# Patient Record
Sex: FEMALE | HISPANIC OR LATINO | URBAN - METROPOLITAN AREA
[De-identification: names, ages, dates, MRNs, and addresses within clinical notes are randomized per-mention and may not be internally consistent; named-entity substitution may affect disease eponyms.]

---

## 2023-08-17 PROBLEM — 197321007: Status: ACTIVE | Noted: 2023-08-17

## 2023-08-17 PROBLEM — 428283002: Status: ACTIVE | Noted: 2023-08-17

## 2023-08-21 ENCOUNTER — LAB OUTSIDE AN ENCOUNTER (OUTPATIENT)
Dept: URBAN - METROPOLITAN AREA CLINIC 60 | Facility: CLINIC | Age: 48
End: 2023-08-21

## 2023-08-21 ENCOUNTER — OFFICE VISIT (OUTPATIENT)
Dept: URBAN - METROPOLITAN AREA CLINIC 60 | Facility: CLINIC | Age: 48
End: 2023-08-21
Payer: COMMERCIAL

## 2023-08-21 ENCOUNTER — WEB ENCOUNTER (OUTPATIENT)
Dept: URBAN - METROPOLITAN AREA CLINIC 60 | Facility: CLINIC | Age: 48
End: 2023-08-21

## 2023-08-21 VITALS
HEIGHT: 63 IN | OXYGEN SATURATION: 96 % | BODY MASS INDEX: 36.14 KG/M2 | WEIGHT: 204 LBS | DIASTOLIC BLOOD PRESSURE: 80 MMHG | SYSTOLIC BLOOD PRESSURE: 130 MMHG | RESPIRATION RATE: 20 BRPM | TEMPERATURE: 98.1 F | HEART RATE: 88 BPM

## 2023-08-21 DIAGNOSIS — K64.8 OTHER HEMORRHOIDS: ICD-10-CM

## 2023-08-21 DIAGNOSIS — R10.11 RUQ ABDOMINAL PAIN: ICD-10-CM

## 2023-08-21 DIAGNOSIS — K62.5 RECTAL BLEEDING: ICD-10-CM

## 2023-08-21 DIAGNOSIS — K76.0 FATTY LIVER: ICD-10-CM

## 2023-08-21 DIAGNOSIS — Z86.010 PERSONAL HISTORY OF COLONIC POLYPS: ICD-10-CM

## 2023-08-21 PROCEDURE — 99204 OFFICE O/P NEW MOD 45 MIN: CPT | Performed by: PHYSICIAN ASSISTANT

## 2023-08-21 RX ORDER — FLUCONAZOLE 200 MG/1
TABLET ORAL
Qty: 25 TABLET | Status: ACTIVE | COMMUNITY

## 2023-08-21 RX ORDER — SUMATRIPTAN SUCCINATE 100 MG/1
TABLET ORAL
Qty: 9 TABLET | Status: ACTIVE | COMMUNITY

## 2023-08-21 RX ORDER — FAMOTIDINE 20 MG/1
1 TABLET AS NEEDED TABLET, FILM COATED ORAL ONCE A DAY
Qty: 30 TABLET | Refills: 2 | OUTPATIENT
Start: 2023-08-21

## 2023-08-21 RX ORDER — POLYETHYLENE GLYCOL 3350, SODIUM CHLORIDE, SODIUM BICARBONATE, POTASSIUM CHLORIDE 420; 11.2; 5.72; 1.48 G/4L; G/4L; G/4L; G/4L
AS DIRECTED POWDER, FOR SOLUTION ORAL ONCE
Qty: 4000 | Refills: 0 | OUTPATIENT
Start: 2023-08-21 | End: 2023-08-22

## 2023-08-21 RX ORDER — FERROUS GLUCONATE 324 MG
TAKE ONE TABLET BY MOUTH TWICE A DAY TABLET ORAL
Qty: 120 UNSPECIFIED | Refills: 1 | Status: ACTIVE | COMMUNITY

## 2023-08-21 RX ORDER — ONDANSETRON HYDROCHLORIDE 4 MG/1
1 TABLET TABLET, FILM COATED ORAL
Qty: 2 | Refills: 0 | OUTPATIENT
Start: 2023-08-21

## 2023-08-21 NOTE — HPI-TODAY'S VISIT:
Imani is a pleasant 48-year-old female who presents today for evaluation of a surveillance colonoscopy and hemorrhoids.  Seen by PCP for right upper quadrant pain, worse with prandial events.  History of fatty liver disease.  Noted rectal bleeding.  History of colonoscopy proximately 20 years ago with a polyp removed.  History of 3 polyps removed about 20 years ago. No record available for review. She mentions chronic history of RUQ pain. Sometimes worse after meals. No specific dietary triggers. Notes a bowel movement daily with alternating bowel habits. Intermittent reflux taking TUMs as needed. Admits to nausea with prandial events but denies vomiting, dysphagia, odynophagia, hematemesis, coffee ground emesis, abnormal weight loss or melena. Occasional BRBPR when wiping and mixed with stools. Denies family history of colon cancer or colon polyps. No other GI complaints at this time

## 2023-08-28 ENCOUNTER — LAB OUTSIDE AN ENCOUNTER (OUTPATIENT)
Dept: URBAN - METROPOLITAN AREA CLINIC 60 | Facility: CLINIC | Age: 48
End: 2023-08-28

## 2023-08-30 ENCOUNTER — TELEPHONE ENCOUNTER (OUTPATIENT)
Dept: URBAN - METROPOLITAN AREA CLINIC 63 | Facility: CLINIC | Age: 48
End: 2023-08-30

## 2023-08-30 ENCOUNTER — LAB OUTSIDE AN ENCOUNTER (OUTPATIENT)
Dept: URBAN - METROPOLITAN AREA CLINIC 63 | Facility: CLINIC | Age: 48
End: 2023-08-30

## 2023-09-12 ENCOUNTER — TELEPHONE ENCOUNTER (OUTPATIENT)
Dept: URBAN - METROPOLITAN AREA CLINIC 63 | Facility: CLINIC | Age: 48
End: 2023-09-12

## 2023-09-12 ENCOUNTER — LAB OUTSIDE AN ENCOUNTER (OUTPATIENT)
Dept: URBAN - METROPOLITAN AREA CLINIC 63 | Facility: CLINIC | Age: 48
End: 2023-09-12

## 2023-09-19 LAB
A/G RATIO: 1.2
ALBUMIN: 3.8
ALKALINE PHOSPHATASE: 65
ALT (SGPT): 17
AST (SGOT): 15
BILIRUBIN, TOTAL: 0.3
BUN/CREATININE RATIO: (no result)
BUN: 7
CALCIUM: 9.1
CARBON DIOXIDE, TOTAL: 29
CHLORIDE: 105
CREATININE: 0.53
EGFR: 115
GLOBULIN, TOTAL: 3.3
GLUCOSE: 93
HEMATOCRIT: 37.8
HEMOGLOBIN: 12
HEP A AB, IGM: (no result)
HEP B CORE AB, IGM: (no result)
HEPATITIS A AB, TOTAL: REACTIVE
HEPATITIS B CORE AB TOTAL: (no result)
HEPATITIS B SURFACE AB IMMUNITY, QN: 11
HEPATITIS B SURFACE ANTIGEN: (no result)
HEPATITIS C ANTIBODY: (no result)
MCH: 26.2
MCHC: 31.7
MCV: 82.5
MPV: 10
PLATELET COUNT: 350
POTASSIUM: 4.1
PROTEIN, TOTAL: 7.1
RDW: 14.2
RED BLOOD CELL COUNT: 4.58
SODIUM: 140
WHITE BLOOD CELL COUNT: 8.7

## 2023-10-03 ENCOUNTER — OFFICE VISIT (OUTPATIENT)
Dept: URBAN - METROPOLITAN AREA SURGERY CENTER 4 | Facility: SURGERY CENTER | Age: 48
End: 2023-10-03
Payer: COMMERCIAL

## 2023-10-03 ENCOUNTER — TELEPHONE ENCOUNTER (OUTPATIENT)
Dept: URBAN - METROPOLITAN AREA CLINIC 63 | Facility: CLINIC | Age: 48
End: 2023-10-03

## 2023-10-03 ENCOUNTER — CLAIMS CREATED FROM THE CLAIM WINDOW (OUTPATIENT)
Dept: URBAN - METROPOLITAN AREA CLINIC 4 | Facility: CLINIC | Age: 48
End: 2023-10-03
Payer: COMMERCIAL

## 2023-10-03 VITALS — HEIGHT: 63 IN

## 2023-10-03 DIAGNOSIS — K31.89 OTHER DISEASES OF STOMACH AND DUODENUM: ICD-10-CM

## 2023-10-03 DIAGNOSIS — Z86.010 PERSONAL HISTORY OF COLONIC POLYPS: ICD-10-CM

## 2023-10-03 DIAGNOSIS — K62.1 RECTAL POLYP: ICD-10-CM

## 2023-10-03 DIAGNOSIS — K20.90 ESOPHAGITIS, UNSPECIFIED WITHOUT BLEEDING: ICD-10-CM

## 2023-10-03 DIAGNOSIS — Z12.11 COLON CANCER SCREENING (HIGH RISK): ICD-10-CM

## 2023-10-03 DIAGNOSIS — K64.8 OTHER HEMORRHOIDS: ICD-10-CM

## 2023-10-03 DIAGNOSIS — K63.5 BENIGN COLON POLYPS: ICD-10-CM

## 2023-10-03 DIAGNOSIS — K29.70 GASTRITIS WITHOUT BLEEDING, UNSPECIFIED CHRONICITY, UNSPECIFIED GASTRITIS TYPE: ICD-10-CM

## 2023-10-03 DIAGNOSIS — K63.5 POLYP OF SIGMOID COLON, UNSPECIFIED TYPE: ICD-10-CM

## 2023-10-03 DIAGNOSIS — R10.11 RUQ ABDOMINAL PAIN: ICD-10-CM

## 2023-10-03 PROCEDURE — 88312 SPECIAL STAINS GROUP 1: CPT | Performed by: PATHOLOGY

## 2023-10-03 PROCEDURE — 88305 TISSUE EXAM BY PATHOLOGIST: CPT | Performed by: PATHOLOGY

## 2023-10-03 PROCEDURE — 43239 EGD BIOPSY SINGLE/MULTIPLE: CPT | Performed by: INTERNAL MEDICINE

## 2023-10-03 PROCEDURE — 00813 ANES UPR LWR GI NDSC PX: CPT | Performed by: NURSE ANESTHETIST, CERTIFIED REGISTERED

## 2023-10-03 PROCEDURE — 45380 COLONOSCOPY AND BIOPSY: CPT | Performed by: INTERNAL MEDICINE

## 2023-10-03 RX ORDER — FAMOTIDINE 20 MG/1
1 TABLET AS NEEDED TABLET, FILM COATED ORAL ONCE A DAY
Qty: 30 TABLET | Refills: 2 | Status: ACTIVE | COMMUNITY
Start: 2023-08-21

## 2023-10-03 RX ORDER — ONDANSETRON HYDROCHLORIDE 4 MG/1
1 TABLET TABLET, FILM COATED ORAL
Qty: 2 | Refills: 0 | Status: ACTIVE | COMMUNITY
Start: 2023-08-21

## 2023-10-03 RX ORDER — FLUCONAZOLE 200 MG/1
TABLET ORAL
Qty: 25 TABLET | Status: ACTIVE | COMMUNITY

## 2023-10-03 RX ORDER — SUMATRIPTAN SUCCINATE 100 MG/1
TABLET ORAL
Qty: 9 TABLET | Status: ACTIVE | COMMUNITY

## 2023-10-03 RX ORDER — FERROUS GLUCONATE 324 MG
TAKE ONE TABLET BY MOUTH TWICE A DAY TABLET ORAL
Qty: 120 UNSPECIFIED | Refills: 1 | Status: ACTIVE | COMMUNITY

## 2023-10-03 RX ORDER — OMEPRAZOLE 20 MG/1
1 TABLET 30 MINUTES BEFORE MORNING MEAL TABLET, DELAYED RELEASE ORAL ONCE A DAY
Qty: 30 | Refills: 2 | OUTPATIENT
Start: 2023-10-03

## 2023-10-04 PROBLEM — 4556007: Status: ACTIVE | Noted: 2023-10-04

## 2023-10-23 ENCOUNTER — LAB OUTSIDE AN ENCOUNTER (OUTPATIENT)
Dept: URBAN - METROPOLITAN AREA CLINIC 60 | Facility: CLINIC | Age: 48
End: 2023-10-23

## 2023-10-23 ENCOUNTER — OFFICE VISIT (OUTPATIENT)
Dept: URBAN - METROPOLITAN AREA CLINIC 60 | Facility: CLINIC | Age: 48
End: 2023-10-23
Payer: COMMERCIAL

## 2023-10-23 VITALS
RESPIRATION RATE: 20 BRPM | WEIGHT: 193 LBS | DIASTOLIC BLOOD PRESSURE: 78 MMHG | BODY MASS INDEX: 34.2 KG/M2 | SYSTOLIC BLOOD PRESSURE: 120 MMHG | HEART RATE: 74 BPM | TEMPERATURE: 97.9 F | HEIGHT: 63 IN | OXYGEN SATURATION: 96 %

## 2023-10-23 DIAGNOSIS — K31.89 GASTRIC NODULE: ICD-10-CM

## 2023-10-23 DIAGNOSIS — K76.89 FOCAL NODULAR HYPERPLASIA OF LIVER: ICD-10-CM

## 2023-10-23 DIAGNOSIS — Z86.010 PERSONAL HISTORY OF COLONIC POLYPS: ICD-10-CM

## 2023-10-23 DIAGNOSIS — K76.0 FATTY LIVER: ICD-10-CM

## 2023-10-23 DIAGNOSIS — K20.90 ESOPHAGITIS, UNSPECIFIED WITHOUT BLEEDING: ICD-10-CM

## 2023-10-23 PROCEDURE — 99214 OFFICE O/P EST MOD 30 MIN: CPT | Performed by: PHYSICIAN ASSISTANT

## 2023-10-23 RX ORDER — OMEPRAZOLE 20 MG/1
1 CAPSULE 30 MINUTES BEFORE MORNING MEAL CAPSULE, DELAYED RELEASE ORAL ONCE A DAY
Status: ACTIVE | COMMUNITY

## 2023-10-23 RX ORDER — MILK THISTLE SEED EXTRACT 175 MG
AS DIRECTED CAPSULE ORAL
Status: ACTIVE | COMMUNITY

## 2023-10-23 RX ORDER — PRENATAL VIT W/ FE FUMARATE-FA TAB 27-0.8 MG 27-0.8 MG
1 TABLET TAB ORAL ONCE A DAY
Status: ACTIVE | COMMUNITY

## 2023-10-23 RX ORDER — SUMATRIPTAN SUCCINATE 100 MG/1
1 TABLET AT LEAST 2 HOURS BETWEEN DOSES AS NEEDED TABLET, FILM COATED ORAL TWICE A DAY
Status: ACTIVE | COMMUNITY

## 2023-10-23 RX ORDER — FAMOTIDINE 20 MG/1
1 TABLET AT BEDTIME AS NEEDED TABLET, FILM COATED ORAL ONCE A DAY
Status: ACTIVE | COMMUNITY

## 2023-10-23 RX ORDER — FLUCONAZOLE 200 MG/1
1 TABLET TABLET ORAL
Status: ACTIVE | COMMUNITY

## 2023-10-23 RX ORDER — MELOXICAM 15 MG/1
1 TABLET TABLET ORAL ONCE A DAY
Status: ACTIVE | COMMUNITY

## 2023-10-23 NOTE — HPI-TODAY'S VISIT:
Imani is a pleasant 48-year-old female who presents today for a procedure follow up. History of fatty liver disease. Personal history of colon polyps  Ultrasound dated 8/25/2023 showed hepatomegaly.  Fatty liver.  5.6 x 4.8 x 5.5 cm mass right lobe of the liver suggesting CT or MRI for follow-up.  No dilated ducts seen.  MRI abdomen/liver with and without contrast dated 9/8/2023 showed hepatomegaly with diffuse severe hepatic steatosis with a focal lobulated enhancing mass in the right hepatic lobe segment 8 measuring 5.6 x 3.9 x 4.4 cm.  While this is suspicious for malignancy differentials include focal fatty sparing or focal nodular hyperplasia and additional evaluation is recommended with Eovist MRI protocol and if this remains inconclusive biopsy would be recommended.  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  Follow-up recommended MRI pancreatic protocol in 1 year.  Other benign small hepatic cyst.  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  As a conservative measure recommend follow-up in 3 months to document stability and to exclude other less likely etiologies.  Stable pancreatic cystic lesions follow-up was recommended in 6 months to document stability  FibroScan dated 9/27/2023 showed S3 and F0  Labs dated 9/19/2023 showed hep B immunity.  Normal CMP.  Normal hemoglobin.  Normal platelets.  Hep A antibody total reactive but IgM negative.  Hep C negative.  Remaining hepatitis panel negative.  EGD dated 10/3/2023 showed nonsevere esophagitis.  Gastritis.  Normal duodenum.  A single submucosal papule (nodule) was found in the stomach measuring 7 mm.  Consider EUS as outpatient. No significant duodenal abnormality.  No significant abnormality in the antrum/body.  Squamous mucosa with reflux type changes.  No columnar mucosa identified.  No evidence of Medina's or EOE.  Colonoscopy dated 10/3/2023 showed a diminutive hyperplastic polyp removed from the distal sigmoid colon.  Diminutive hyperplastic polyp removed from the rectum.  Nonbleeding internal hemorrhoids present upon retroflexion.  No issues after procedure. Last visit mentioned chronic history of RUQ pain. Placed on omeprazole after the procedures. Pain subsided. Notes a bowel movement daily without constipation or diarrhea. Denies nausea, vomiting, heartburn, reflux, dysphagia, odynophagia, hematemesis, coffee ground emesis, abdominal pain, change in bowel habits, abnormal weight loss, BRBPR or melena. Denies family history of colon cancer or colon polyps. No other GI complaints at this time

## 2023-10-29 ENCOUNTER — ERX REFILL RESPONSE (OUTPATIENT)
Dept: URBAN - METROPOLITAN AREA CLINIC 63 | Facility: CLINIC | Age: 48
End: 2023-10-29

## 2023-10-29 RX ORDER — OMEPRAZOLE 20 MG/1
TAKE 1 CAPSULE BY MOUTH EVERY MORNING 30 MINUTES BEFORE BREAKFAST CAPSULE, DELAYED RELEASE ORAL
Qty: 30 CAPSULE | Refills: 0 | OUTPATIENT

## 2024-01-19 ENCOUNTER — TELEPHONE ENCOUNTER (OUTPATIENT)
Dept: URBAN - METROPOLITAN AREA CLINIC 63 | Facility: CLINIC | Age: 49
End: 2024-01-19

## 2024-01-22 ENCOUNTER — OFFICE VISIT (OUTPATIENT)
Dept: URBAN - METROPOLITAN AREA MEDICAL CENTER 3 | Facility: MEDICAL CENTER | Age: 49
End: 2024-01-22

## 2024-02-05 ENCOUNTER — OV EP (OUTPATIENT)
Dept: URBAN - METROPOLITAN AREA CLINIC 60 | Facility: CLINIC | Age: 49
End: 2024-02-05

## 2024-02-05 RX ORDER — PRENATAL VIT W/ FE FUMARATE-FA TAB 27-0.8 MG 27-0.8 MG
1 TABLET TAB ORAL ONCE A DAY
COMMUNITY

## 2024-02-05 RX ORDER — FLUCONAZOLE 200 MG/1
1 TABLET TABLET ORAL
COMMUNITY

## 2024-02-05 RX ORDER — MELOXICAM 15 MG/1
1 TABLET TABLET ORAL ONCE A DAY
COMMUNITY

## 2024-02-05 RX ORDER — OMEPRAZOLE 20 MG/1
TAKE 1 CAPSULE BY MOUTH EVERY MORNING 30 MINUTES BEFORE BREAKFAST CAPSULE, DELAYED RELEASE ORAL
Qty: 30 CAPSULE | Refills: 0 | COMMUNITY

## 2024-02-05 RX ORDER — MILK THISTLE SEED EXTRACT 175 MG
AS DIRECTED CAPSULE ORAL
COMMUNITY

## 2024-02-05 RX ORDER — SUMATRIPTAN SUCCINATE 100 MG/1
1 TABLET AT LEAST 2 HOURS BETWEEN DOSES AS NEEDED TABLET, FILM COATED ORAL TWICE A DAY
COMMUNITY

## 2024-02-05 RX ORDER — FAMOTIDINE 20 MG/1
1 TABLET AT BEDTIME AS NEEDED TABLET, FILM COATED ORAL ONCE A DAY
COMMUNITY